# Patient Record
Sex: FEMALE | Race: WHITE
[De-identification: names, ages, dates, MRNs, and addresses within clinical notes are randomized per-mention and may not be internally consistent; named-entity substitution may affect disease eponyms.]

---

## 2018-12-07 ENCOUNTER — HOSPITAL ENCOUNTER (EMERGENCY)
Dept: HOSPITAL 80 - FED | Age: 48
Discharge: HOME | End: 2018-12-07
Payer: COMMERCIAL

## 2018-12-07 VITALS — SYSTOLIC BLOOD PRESSURE: 98 MMHG | DIASTOLIC BLOOD PRESSURE: 60 MMHG

## 2018-12-07 DIAGNOSIS — R10.11: Primary | ICD-10-CM

## 2018-12-07 DIAGNOSIS — K90.0: ICD-10-CM

## 2018-12-07 DIAGNOSIS — R11.0: ICD-10-CM

## 2018-12-07 DIAGNOSIS — E86.9: ICD-10-CM

## 2018-12-07 LAB — PLATELET # BLD: 301 10^3/UL (ref 150–400)

## 2018-12-07 NOTE — EDPHY
H & P


Stated Complaint: r sided abd pain


Source: Patient


Exam Limitations: No limitations





- Personal History


LMP (Females 10-55): Irregular


Current Tetanus Diphtheria and Acellular Pertussis (TDAP): Yes





- Medical/Surgical History


Hx Asthma: No


Hx Chronic Respiratory Disease: No


Hx Diabetes: No


Hx Cardiac Disease: No


Hx Renal Disease: No


Hx Cirrhosis: No


Hx Alcoholism: No


Hx HIV/AIDS: No


Hx Splenectomy or Spleen Trauma: No


Other PMH: celiac disease





- Social History


Smoking Status: Never smoked


Time Seen by Provider: 12/07/18 11:40


HPI/ROS: 


HPI:  This is a 48-year-old female who presents with 





Chief Complaint: r sided abd pain





Location:  Right upper quadrant abdominal


Quality:  Pain


Duration:  Since 2:00 a.m.


Signs and Symptoms: no fever, + nausea, no vomiting, no hematemesis, no blood 

in stool, no abdominal bloating, no diarrhea, no back pain, no urinary symptoms

, no vaginal bleeding/discharge, no indigestion, no chest pain, no shortness of 

breath


Timing:  Acute, constant


Severity:  Severe


Context:  Patient presents with sudden onset waking her up around 2:00 a.m. Of 

right upper quadrant pain that was constant, and "the worst pain that she has 

ever felt."  She reports that it lasted until around 9:00 a.m. When she came 

into the emergency room for further evaluation.  It was accompanied by nausea 

but no vomiting, diarrhea, back pain, urinary symptoms.  She reports that she 

had a bowel movement yesterday.  She had a similar occurrence happened 1 year 

ago with an outpatient ultrasound that was normal per the patient.  The thought 

that it may be related to biliary sludge.  She reports that she is extremely 

healthy and does not believe that she ate anything yesterday that causes 

symptoms.  She denies any fevers.  She reports now she just feels 

uncomfortable.  She denies any lower extremity edema, chest pain, shortness of 

breath.  No history of kidney stones and denies blood in urine or burning with 

urination.  


Modifying Factors:  None





Comment: 








ROS:  A comprehensive 10 system review of systems is otherwise negative aside 

from elements mentioned in the history of present illness. 





MEDICAL/SURGICAL/SOCIAL HISTORY: 


Medical history:  Celiac disease.  Irregular menses.


Surgical history:  Denies


Social history:  .  Nonsmoker.  Denies drug use.  Family history 

noncontributory.








CONSTITUTIONAL:  Extremely polite and cooperative middle-aged white female, 

awake and alert, no obvious distress


HEENT: Atraumatic and normocephalic, PERRL, EOMI.  Nares patent; no rhinorrhea;

  no nasal mucosal edema. Tympanic membranes clear. Oropharynx clear, no 

exudate and moist pink mucosa.  Airway patent.  No lymphadenopathy.  No 

meningismus.


Cardiovascular: Normal S1/S2, regular rate, regular rhythm, without murmur rub 

or gallop.


PULMONARY/CHEST:  Symmetrical and nontender. Clear to auscultation bilaterally. 

Good air movement. No accessory muscle usage.


ABDOMEN:  Soft, nondistended, moderate right upper quadrant tenderness, no 

rebound, no guarding, no peritoneal signs, no masses or organomegaly. No CVAT.  

Bowel sounds heard x4 quadrants


EXTREMITIES:  2/2 pulses, strength 5/5, no deformities, no clubbing, no 

cyanosis or edema.


NEUROLOGICAL: no focal neuro deficits.  GCS 15.


SKIN: Warm and dry, no erythema. no rash.  Good capillary refill. 





 (France Garcia)


Constitutional: 





 Initial Vital Signs











Temperature (C)  36.6 C   12/07/18 11:22


 


Heart Rate  54 L  12/07/18 11:22


 


Respiratory Rate  18   12/07/18 11:22


 


Blood Pressure  106/57 L  12/07/18 11:22


 


O2 Sat (%)  99   12/07/18 11:22








 











O2 Delivery Mode               Room Air














Allergies/Adverse Reactions: 


 





No Known Allergies Allergy (Verified 12/07/18 11:22)


 








Home Medications: 














 Medication  Instructions  Recorded


 


Ondansetron Odt [Zofran Odt 4 mg 4 mg PO Q4 PRN #12 tab 12/07/18





(*)]  


 


oxyCODONE/APAP 5/325 [Percocet 1 - 2 tab PO Q4H PRN #10 tab 12/07/18





5/325 (*)]  














Medical Decision Making


ED Course/Re-evaluation: 


Vital signs reviewed and stable upon arrival.  No systemic signs.


IV access and laboratory studies along with urinalysis and right upper quadrant 

ultrasound ordered


Given 1 L normal saline, IV Toradol, IV Zofran


1243:  Called by radiologist, Dr. Sung, who reported right upper quadrant 

ultrasound is essentially unremarkable.


1245:  Labs reviewed.  No signs of leukocytosis/anemia/platelet dysfunction/JENNIFER/

elevated LFTs/electrolyte imbalance/pancreatitis/VTE. 


CT abdomen and pelvis scan ordered


1338:  Called by radiologist, who advised that CT abdomen and pelvis scan shows 

no signs of appendicitis, pancreatitis, obstruction, colitis, kidney stones, 

diverticulitis but does show moderate stool burden in the right colon indenting 

into the bladder.


1347:  Urinalysis shows blood and RBCs but no jay signs of infection. 


Went over laboratory results at bedside and ultrasound/CT scans.  Patient does 

not believe that this is related to constipation.  Referral to Gastroenterology 

given.  Patient reports that she has not found a good gastroenterologist and 

has been to several in the area.  She may benefit from a HIDA scan outpatient.  

Given Percocet and prescription for Percocet and Zofran.











This patient was seen under the supervision of my secondary supervising 

physician.  I evaluated care for this patient independently.  Discussed this 

patient with Dr. Douglas who did not see the patient.  


 (France Garcia)





The patient was evaluated and managed by the physician assistant.  I have 

reviewed this chart and I agree with the findings and plan of care as documented

, as indicated by my signature.  I am the secondary supervising physician. (

Keshia Douglas)


Differential Diagnosis: 


Abdominal pain including but not limited to appendicitis, cholecystitis, 

gastritis and urinary tract infection.


 (France Garcia)





- Data Points


Laboratory Results: 





 Laboratory Results





 12/07/18 11:50 





 12/07/18 11:50 








Medications Given: 





 








Discontinued Medications





Sodium Chloride (Ns)  1,000 mls @ 0 mls/hr IV EDNOW ONE; Wide Open


   PRN Reason: Protocol


   Stop: 12/07/18 11:42


   Last Admin: 12/07/18 12:05 Dose:  1,000 mls


Ketorolac Tromethamine (Toradol)  30 mg IVP EDNOW ONE


   Stop: 12/07/18 11:42


   Last Admin: 12/07/18 12:04 Dose:  30 mg


Ondansetron HCl (Zofran)  4 mg IVP EDNOW ONE


   Stop: 12/07/18 11:42


   Last Admin: 12/07/18 12:05 Dose:  4 mg


Oxycodone/Acetaminophen (Percocet 5/325)  1 tab PO EDNOW ONE


   Stop: 12/07/18 13:42


   Last Admin: 12/07/18 13:55 Dose:  1 tab








Departure





- Departure


Disposition: Home, Routine, Self-Care


Clinical Impression: 


 Right upper quadrant abdominal pain, Celiac disease





Condition: Good


Instructions:  Oxycodone/Acetaminophen (By mouth), Ondansetron (By mouth), 

Celiac Disease (ED), Gluten-Free Diet (ED), Gallbladder Ejection Fraction (DC), 

HIDA Scan (DC), Acute Abdominal Pain (ED)


Additional Instructions: 


Consume a minimum of 8-10 glasses of water or electrolyte fluid replacement 

drinks that include Gatorade, Powerade, Pedialyte. 


Eat a bland diet for the next 48 hours and then slowly advance as tolerated. 


Take MiraLax daily as needed for constipation.


Take over-the-counter simethicone/Gas-X as needed for abdominal gas pain.


Take Zofran 1 tab every 4 hours as needed for nausea, vomiting. 


Take Percocet every 4-6 hours as needed for severe, breakthrough pain.


Follow-up with Gastroenterology at [facility] for further evaluation.  Please 

call the clinic at [phone].











Referrals: 


PCP Not In,Dictionary [Medical Doctor] - As per Instructions


Prescriptions: 


Ondansetron Odt [Zofran Odt 4 mg (*)] 4 mg PO Q4 PRN #12 tab


 PRN Reason: Nausea/Vomiting, Use 1st


oxyCODONE/APAP 5/325 [Percocet 5/325 (*)] 1 - 2 tab PO Q4H PRN #10 tab


 PRN Reason: Pain, Severe